# Patient Record
Sex: FEMALE
[De-identification: names, ages, dates, MRNs, and addresses within clinical notes are randomized per-mention and may not be internally consistent; named-entity substitution may affect disease eponyms.]

---

## 2019-08-19 ENCOUNTER — HOSPITAL ENCOUNTER (EMERGENCY)
Dept: HOSPITAL 90 - EDH | Age: 64
Discharge: HOME | End: 2019-08-19
Payer: COMMERCIAL

## 2019-08-19 DIAGNOSIS — Z88.1: ICD-10-CM
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
## 2019-08-19 DIAGNOSIS — S16.1XXA: Primary | ICD-10-CM
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
## 2019-08-19 DIAGNOSIS — M54.12: ICD-10-CM

## 2019-08-19 DIAGNOSIS — R20.2: ICD-10-CM

## 2019-08-19 DIAGNOSIS — Z90.710: ICD-10-CM

## 2019-08-19 LAB
ALBUMIN SERPL-MCNC: 4 G/DL (ref 3.5–5)
ALT SERPL-CCNC: 17 U/L (ref 12–78)
AMPHETAMINES UR QL SCN: NEGATIVE
APTT PPP: 26.6 SEC (ref 26.3–35.5)
AST SERPL-CCNC: 19 U/L (ref 10–37)
BARBITURATES UR QL SCN: NEGATIVE
BASOPHILS NFR BLD AUTO: 0.7 % (ref 0–5)
BENZODIAZ UR QL SCN: NEGATIVE
BILIRUB SERPL-MCNC: 0.6 MG/DL (ref 0.2–1)
BUN SERPL-MCNC: 14 MG/DL (ref 7–18)
BZE UR QL SCN: NEGATIVE
CANNABINOIDS UR QL SCN: NEGATIVE
CHLORIDE SERPL-SCNC: 105 MMOL/L (ref 101–111)
CK SERPL-CCNC: 83 U/L (ref 21–232)
CO2 SERPL-SCNC: 31 MMOL/L (ref 21–32)
CREAT SERPL-MCNC: 0.7 MG/DL (ref 0.5–1.5)
EOSINOPHIL NFR BLD AUTO: 1.1 % (ref 0–8)
ERYTHROCYTE [DISTWIDTH] IN BLOOD BY AUTOMATED COUNT: 13.2 % (ref 11–15.5)
GFR SERPL CREATININE-BSD FRML MDRD: 90 ML/MIN (ref 60–?)
GLUCOSE SERPL-MCNC: 97 MG/DL (ref 70–105)
HCT VFR BLD AUTO: 44.8 % (ref 36–48)
INR PPP: 1.03 (ref 0.85–1.15)
LDLC SERPL CALC-MCNC: 108 MG/DL (ref 0–99)
LYMPHOCYTES NFR SPEC AUTO: 13.7 % (ref 21–51)
MCH RBC QN AUTO: 29.8 PG (ref 27–33)
MCHC RBC AUTO-ENTMCNC: 33.6 G/DL (ref 32–36)
MCV RBC AUTO: 88.7 FL (ref 79–99)
MONOCYTES NFR BLD AUTO: 5.8 % (ref 3–13)
NEUTROPHILS NFR BLD AUTO: 78.7 % (ref 40–77)
NRBC BLD MANUAL-RTO: 0 % (ref 0–0.19)
OPIATES UR QL SCN: NEGATIVE
PCP UR QL SCN: NEGATIVE
PH UR STRIP: 7 [PH] (ref 5–8)
PLATELET # BLD AUTO: 269 K/UL (ref 130–400)
POTASSIUM SERPL-SCNC: 3.6 MMOL/L (ref 3.5–5.1)
PROT SERPL-MCNC: 7.5 G/DL (ref 6–8.3)
PROTHROMBIN TIME: 10.8 SEC (ref 9.6–11.6)
RBC # BLD AUTO: 5.05 MIL/UL (ref 4–5.5)
SODIUM SERPL-SCNC: 143 MMOL/L (ref 136–145)
SP GR UR STRIP: 1.01 (ref 1–1.03)
UROBILINOGEN UR STRIP-MCNC: 0.2 MG/DL (ref 0.2–1)
WBC # BLD AUTO: 8.5 K/UL (ref 4.8–10.8)

## 2019-08-19 PROCEDURE — 80305 DRUG TEST PRSMV DIR OPT OBS: CPT

## 2019-08-19 PROCEDURE — 80053 COMPREHEN METABOLIC PANEL: CPT

## 2019-08-19 PROCEDURE — 70450 CT HEAD/BRAIN W/O DYE: CPT

## 2019-08-19 PROCEDURE — 70496 CT ANGIOGRAPHY HEAD: CPT

## 2019-08-19 PROCEDURE — 93005 ELECTROCARDIOGRAM TRACING: CPT

## 2019-08-19 PROCEDURE — 36415 COLL VENOUS BLD VENIPUNCTURE: CPT

## 2019-08-19 PROCEDURE — 81003 URINALYSIS AUTO W/O SCOPE: CPT

## 2019-08-19 PROCEDURE — 82948 REAGENT STRIP/BLOOD GLUCOSE: CPT

## 2019-08-19 PROCEDURE — 83721 ASSAY OF BLOOD LIPOPROTEIN: CPT

## 2019-08-19 PROCEDURE — 85610 PROTHROMBIN TIME: CPT

## 2019-08-19 PROCEDURE — 84484 ASSAY OF TROPONIN QUANT: CPT

## 2019-08-19 PROCEDURE — 82550 ASSAY OF CK (CPK): CPT

## 2019-08-19 PROCEDURE — 71045 X-RAY EXAM CHEST 1 VIEW: CPT

## 2019-08-19 PROCEDURE — 85730 THROMBOPLASTIN TIME PARTIAL: CPT

## 2019-08-19 PROCEDURE — 70498 CT ANGIOGRAPHY NECK: CPT

## 2019-08-19 PROCEDURE — 85025 COMPLETE CBC W/AUTO DIFF WBC: CPT

## 2021-05-17 ENCOUNTER — HOSPITAL ENCOUNTER (EMERGENCY)
Dept: HOSPITAL 90 - EDH | Age: 66
Discharge: HOME | End: 2021-05-17
Payer: SELF-PAY

## 2021-05-17 DIAGNOSIS — Z88.6: ICD-10-CM

## 2021-05-17 DIAGNOSIS — Z88.8: ICD-10-CM

## 2021-05-17 DIAGNOSIS — R07.89: ICD-10-CM

## 2021-05-17 DIAGNOSIS — M54.16: Primary | ICD-10-CM

## 2021-05-17 DIAGNOSIS — G89.4: ICD-10-CM

## 2021-05-17 DIAGNOSIS — Z90.710: ICD-10-CM

## 2021-05-17 LAB
ALBUMIN SERPL-MCNC: 3.8 G/DL (ref 3.5–5)
ALT SERPL-CCNC: 27 U/L (ref 12–78)
AST SERPL-CCNC: 19 U/L (ref 10–37)
BASOPHILS NFR BLD AUTO: 1.2 % (ref 0–5)
BILIRUB SERPL-MCNC: 0.5 MG/DL (ref 0.2–1)
BNP SERPL-MCNC: 196 PG/ML (ref 0–100)
BUN SERPL-MCNC: 16 MG/DL (ref 7–18)
CHLORIDE SERPL-SCNC: 105 MMOL/L (ref 101–111)
CK SERPL-CCNC: 84 U/L (ref 21–232)
CO2 SERPL-SCNC: 33 MMOL/L (ref 21–32)
CREAT SERPL-MCNC: 0.7 MG/DL (ref 0.5–1.5)
EOSINOPHIL NFR BLD AUTO: 4.5 % (ref 0–8)
ERYTHROCYTE [DISTWIDTH] IN BLOOD BY AUTOMATED COUNT: 13.4 % (ref 11–15.5)
GFR SERPL CREATININE-BSD FRML MDRD: 90 ML/MIN (ref 60–?)
GLUCOSE SERPL-MCNC: 107 MG/DL (ref 70–105)
HCT VFR BLD AUTO: 44.4 % (ref 36–48)
LYMPHOCYTES NFR SPEC AUTO: 27.7 % (ref 21–51)
MCH RBC QN AUTO: 29.2 PG (ref 27–33)
MCHC RBC AUTO-ENTMCNC: 33.3 G/DL (ref 32–36)
MCV RBC AUTO: 87.6 FL (ref 79–99)
MONOCYTES NFR BLD AUTO: 8.7 % (ref 3–13)
NEUTROPHILS NFR BLD AUTO: 57.6 % (ref 40–77)
NRBC BLD MANUAL-RTO: 0 % (ref 0–0.19)
PLATELET # BLD AUTO: 222 K/UL (ref 130–400)
POTASSIUM SERPL-SCNC: 4 MMOL/L (ref 3.5–5.1)
PROT SERPL-MCNC: 7.1 G/DL (ref 6–8.3)
RBC # BLD AUTO: 5.07 MIL/UL (ref 4–5.5)
SODIUM SERPL-SCNC: 142 MMOL/L (ref 136–145)
WBC # BLD AUTO: 5.8 K/UL (ref 4.8–10.8)

## 2021-05-17 PROCEDURE — 84484 ASSAY OF TROPONIN QUANT: CPT

## 2021-05-17 PROCEDURE — 73630 X-RAY EXAM OF FOOT: CPT

## 2021-05-17 PROCEDURE — 93005 ELECTROCARDIOGRAM TRACING: CPT

## 2021-05-17 PROCEDURE — 72131 CT LUMBAR SPINE W/O DYE: CPT

## 2021-05-17 PROCEDURE — 80053 COMPREHEN METABOLIC PANEL: CPT

## 2021-05-17 PROCEDURE — 72170 X-RAY EXAM OF PELVIS: CPT

## 2021-05-17 PROCEDURE — 73610 X-RAY EXAM OF ANKLE: CPT

## 2021-05-17 PROCEDURE — 71046 X-RAY EXAM CHEST 2 VIEWS: CPT

## 2021-05-17 PROCEDURE — 83880 ASSAY OF NATRIURETIC PEPTIDE: CPT

## 2021-05-17 PROCEDURE — 73562 X-RAY EXAM OF KNEE 3: CPT

## 2021-05-17 PROCEDURE — 93971 EXTREMITY STUDY: CPT

## 2021-05-17 PROCEDURE — 85025 COMPLETE CBC W/AUTO DIFF WBC: CPT

## 2021-05-17 PROCEDURE — 82550 ASSAY OF CK (CPK): CPT

## 2021-05-17 PROCEDURE — 36415 COLL VENOUS BLD VENIPUNCTURE: CPT

## 2021-06-10 ENCOUNTER — HOSPITAL ENCOUNTER (EMERGENCY)
Dept: HOSPITAL 90 - EDH | Age: 66
Discharge: HOME | End: 2021-06-10
Payer: COMMERCIAL

## 2021-06-10 VITALS — DIASTOLIC BLOOD PRESSURE: 71 MMHG | SYSTOLIC BLOOD PRESSURE: 134 MMHG

## 2021-06-10 VITALS — HEIGHT: 65 IN | WEIGHT: 138.89 LBS | BODY MASS INDEX: 23.14 KG/M2

## 2021-06-10 VITALS — SYSTOLIC BLOOD PRESSURE: 132 MMHG | DIASTOLIC BLOOD PRESSURE: 74 MMHG

## 2021-06-10 DIAGNOSIS — R20.0: Primary | ICD-10-CM

## 2021-06-10 DIAGNOSIS — M79.604: ICD-10-CM

## 2021-06-10 DIAGNOSIS — R94.31: ICD-10-CM

## 2021-06-10 DIAGNOSIS — M79.605: ICD-10-CM

## 2021-06-10 DIAGNOSIS — Z79.899: ICD-10-CM

## 2021-06-10 DIAGNOSIS — M19.90: ICD-10-CM

## 2021-06-10 DIAGNOSIS — Z90.710: ICD-10-CM

## 2021-06-10 LAB
ALBUMIN SERPL-MCNC: 4.1 G/DL (ref 3.5–5)
ALT SERPL-CCNC: 28 U/L (ref 12–78)
AST SERPL-CCNC: 21 U/L (ref 10–37)
BASOPHILS NFR BLD AUTO: 0.7 % (ref 0–5)
BILIRUB SERPL-MCNC: 0.6 MG/DL (ref 0.2–1)
BNP SERPL-MCNC: 114 PG/ML (ref 0–100)
BUN SERPL-MCNC: 18 MG/DL (ref 7–18)
CHLORIDE SERPL-SCNC: 103 MMOL/L (ref 101–111)
CK SERPL-CCNC: 104 U/L (ref 21–232)
CO2 SERPL-SCNC: 31 MMOL/L (ref 21–32)
CREAT SERPL-MCNC: 0.6 MG/DL (ref 0.5–1.5)
EOSINOPHIL NFR BLD AUTO: 1.9 % (ref 0–8)
ERYTHROCYTE [DISTWIDTH] IN BLOOD BY AUTOMATED COUNT: 13.5 % (ref 11–15.5)
GFR SERPL CREATININE-BSD FRML MDRD: 107 ML/MIN (ref 60–?)
GLUCOSE SERPL-MCNC: 97 MG/DL (ref 70–105)
HCT VFR BLD AUTO: 45.3 % (ref 36–48)
LYMPHOCYTES NFR SPEC AUTO: 17.6 % (ref 21–51)
MCH RBC QN AUTO: 28.9 PG (ref 27–33)
MCHC RBC AUTO-ENTMCNC: 32.5 G/DL (ref 32–36)
MCV RBC AUTO: 89 FL (ref 79–99)
MONOCYTES NFR BLD AUTO: 8.5 % (ref 3–13)
MYOGLOBIN SERPL-MCNC: 27 NG/ML (ref 10–92)
NEUTROPHILS NFR BLD AUTO: 71 % (ref 40–77)
NRBC BLD MANUAL-RTO: 0 % (ref 0–0.19)
PLATELET # BLD AUTO: 265 K/UL (ref 130–400)
POTASSIUM SERPL-SCNC: 4.1 MMOL/L (ref 3.5–5.1)
PROT SERPL-MCNC: 7.6 G/DL (ref 6–8.3)
RBC # BLD AUTO: 5.09 MIL/UL (ref 4–5.5)
SODIUM SERPL-SCNC: 141 MMOL/L (ref 136–145)
TROPONIN I SERPL-MCNC: < 0.04 NG/ML (ref 0–0.06)
WBC # BLD AUTO: 8.9 K/UL (ref 4.8–10.8)

## 2021-06-10 PROCEDURE — 85025 COMPLETE CBC W/AUTO DIFF WBC: CPT

## 2021-06-10 PROCEDURE — 83880 ASSAY OF NATRIURETIC PEPTIDE: CPT

## 2021-06-10 PROCEDURE — 80053 COMPREHEN METABOLIC PANEL: CPT

## 2021-06-10 PROCEDURE — 83874 ASSAY OF MYOGLOBIN: CPT

## 2021-06-10 PROCEDURE — 93005 ELECTROCARDIOGRAM TRACING: CPT

## 2021-06-10 PROCEDURE — 82550 ASSAY OF CK (CPK): CPT

## 2021-06-10 PROCEDURE — 93970 EXTREMITY STUDY: CPT

## 2021-06-10 PROCEDURE — 36415 COLL VENOUS BLD VENIPUNCTURE: CPT

## 2021-06-10 PROCEDURE — 84484 ASSAY OF TROPONIN QUANT: CPT

## 2022-02-24 ENCOUNTER — HOSPITAL ENCOUNTER (OUTPATIENT)
Dept: HOSPITAL 90 - RAH | Age: 67
Discharge: HOME | End: 2022-02-24
Attending: INTERNAL MEDICINE
Payer: COMMERCIAL

## 2022-02-24 DIAGNOSIS — R22.1: ICD-10-CM

## 2022-02-24 DIAGNOSIS — E07.89: Primary | ICD-10-CM

## 2022-02-24 PROCEDURE — 76536 US EXAM OF HEAD AND NECK: CPT

## 2022-03-01 ENCOUNTER — HOSPITAL ENCOUNTER (EMERGENCY)
Dept: HOSPITAL 90 - EDH | Age: 67
Discharge: HOME | End: 2022-03-01
Payer: COMMERCIAL

## 2022-03-01 VITALS — DIASTOLIC BLOOD PRESSURE: 70 MMHG | SYSTOLIC BLOOD PRESSURE: 140 MMHG

## 2022-03-01 VITALS — WEIGHT: 136.03 LBS | BODY MASS INDEX: 21.86 KG/M2 | HEIGHT: 66 IN

## 2022-03-01 DIAGNOSIS — F41.9: ICD-10-CM

## 2022-03-01 DIAGNOSIS — Z88.6: ICD-10-CM

## 2022-03-01 DIAGNOSIS — G47.00: Primary | ICD-10-CM

## 2022-03-01 LAB
ALBUMIN SERPL-MCNC: 3.7 G/DL (ref 3.5–5)
ALT SERPL-CCNC: 28 U/L (ref 12–78)
AMPHETAMINES UR QL SCN: NEGATIVE
APPEARANCE UR: CLEAR
AST SERPL-CCNC: 22 U/L (ref 10–37)
BARBITURATES UR QL SCN: NEGATIVE
BASOPHILS NFR BLD AUTO: 0.5 % (ref 0–5)
BENZODIAZ UR QL SCN: NEGATIVE
BILIRUB SERPL-MCNC: 0.3 MG/DL (ref 0.2–1)
BILIRUB UR QL STRIP: NEGATIVE
BUN SERPL-MCNC: 26 MG/DL (ref 7–18)
BZE UR QL SCN: NEGATIVE
CANNABINOIDS UR QL SCN: NEGATIVE
CHLORIDE SERPL-SCNC: 101 MMOL/L (ref 101–111)
CO2 SERPL-SCNC: 29 MMOL/L (ref 21–32)
COLOR UR: YELLOW
CREAT SERPL-MCNC: 0.5 MG/DL (ref 0.5–1.5)
EOSINOPHIL NFR BLD AUTO: 1.7 % (ref 0–8)
ERYTHROCYTE [DISTWIDTH] IN BLOOD BY AUTOMATED COUNT: 13.4 % (ref 11–15.5)
GFR SERPL CREATININE-BSD FRML MDRD: 132 ML/MIN (ref 60–?)
GLUCOSE SERPL-MCNC: 97 MG/DL (ref 70–105)
GLUCOSE UR STRIP-MCNC: NEGATIVE MG/DL
HCT VFR BLD AUTO: 43.6 % (ref 36–48)
HGB UR QL STRIP: NEGATIVE
KETONES UR STRIP-MCNC: NEGATIVE MG/DL
LEUKOCYTE ESTERASE UR QL STRIP: (no result)
LYMPHOCYTES NFR SPEC AUTO: 14.3 % (ref 21–51)
MAGNESIUM SERPL-MCNC: 2 MG/DL (ref 1.8–2.4)
MCH RBC QN AUTO: 28.7 PG (ref 27–33)
MCHC RBC AUTO-ENTMCNC: 32.6 G/DL (ref 32–36)
MCV RBC AUTO: 88.3 FL (ref 79–99)
MONOCYTES NFR BLD AUTO: 7.3 % (ref 3–13)
NEUTROPHILS NFR BLD AUTO: 75.8 % (ref 40–77)
NITRITE UR QL STRIP: NEGATIVE
NRBC BLD MANUAL-RTO: 0 % (ref 0–0.19)
OPIATES UR QL SCN: NEGATIVE
PCP UR QL SCN: NEGATIVE
PH UR STRIP: 6.5 [PH] (ref 5–8)
PLATELET # BLD AUTO: 233 K/UL (ref 130–400)
POTASSIUM SERPL-SCNC: 4.1 MMOL/L (ref 3.5–5.1)
PROT SERPL-MCNC: 7.2 G/DL (ref 6–8.3)
PROT UR QL STRIP: NEGATIVE MG/DL
RBC # BLD AUTO: 4.94 MIL/UL (ref 4–5.5)
RBC #/AREA URNS HPF: (no result) /HPF (ref 0–1)
SODIUM SERPL-SCNC: 138 MMOL/L (ref 136–145)
SP GR UR STRIP: 1.01 (ref 1–1.03)
UROBILINOGEN UR STRIP-MCNC: 0.2 MG/DL (ref 0.2–1)
WBC # BLD AUTO: 8.3 K/UL (ref 4.8–10.8)
WBC #/AREA URNS HPF: (no result) /HPF (ref 0–1)

## 2022-03-01 PROCEDURE — 71045 X-RAY EXAM CHEST 1 VIEW: CPT

## 2022-03-01 PROCEDURE — 36415 COLL VENOUS BLD VENIPUNCTURE: CPT

## 2022-03-01 PROCEDURE — 80305 DRUG TEST PRSMV DIR OPT OBS: CPT

## 2022-03-01 PROCEDURE — 84443 ASSAY THYROID STIM HORMONE: CPT

## 2022-03-01 PROCEDURE — 85025 COMPLETE CBC W/AUTO DIFF WBC: CPT

## 2022-03-01 PROCEDURE — 84484 ASSAY OF TROPONIN QUANT: CPT

## 2022-03-01 PROCEDURE — 93005 ELECTROCARDIOGRAM TRACING: CPT

## 2022-03-01 PROCEDURE — 83735 ASSAY OF MAGNESIUM: CPT

## 2022-03-01 PROCEDURE — 81001 URINALYSIS AUTO W/SCOPE: CPT

## 2022-03-01 PROCEDURE — 84439 ASSAY OF FREE THYROXINE: CPT

## 2022-03-01 PROCEDURE — 80053 COMPREHEN METABOLIC PANEL: CPT

## 2022-05-04 ENCOUNTER — HOSPITAL ENCOUNTER (OUTPATIENT)
Dept: HOSPITAL 90 - LAB | Age: 67
End: 2022-05-04
Attending: INTERNAL MEDICINE
Payer: COMMERCIAL

## 2022-05-04 DIAGNOSIS — R07.9: Primary | ICD-10-CM

## 2022-05-04 PROCEDURE — 84484 ASSAY OF TROPONIN QUANT: CPT

## 2022-05-04 PROCEDURE — 36415 COLL VENOUS BLD VENIPUNCTURE: CPT

## 2022-05-04 PROCEDURE — 93005 ELECTROCARDIOGRAM TRACING: CPT

## 2022-05-04 PROCEDURE — 82550 ASSAY OF CK (CPK): CPT

## 2022-06-14 ENCOUNTER — HOSPITAL ENCOUNTER (OUTPATIENT)
Dept: HOSPITAL 90 - LAB | Age: 67
Discharge: HOME | End: 2022-06-14
Attending: OTOLARYNGOLOGY
Payer: COMMERCIAL

## 2022-06-14 DIAGNOSIS — H90.3: Primary | ICD-10-CM

## 2022-06-14 LAB
BUN SERPL-MCNC: 19 MG/DL (ref 7–18)
CREAT SERPL-MCNC: 0.6 MG/DL (ref 0.5–1.5)
GFR SERPL CREATININE-BSD FRML MDRD: 107 ML/MIN (ref 60–?)

## 2022-06-14 PROCEDURE — 82565 ASSAY OF CREATININE: CPT

## 2022-06-14 PROCEDURE — 84520 ASSAY OF UREA NITROGEN: CPT

## 2022-06-14 PROCEDURE — 36415 COLL VENOUS BLD VENIPUNCTURE: CPT

## 2022-08-05 ENCOUNTER — HOSPITAL ENCOUNTER (OUTPATIENT)
Dept: HOSPITAL 90 - RAH | Age: 67
Discharge: HOME | End: 2022-08-05
Attending: OTOLARYNGOLOGY
Payer: COMMERCIAL

## 2022-08-05 DIAGNOSIS — E04.1: ICD-10-CM

## 2022-08-05 DIAGNOSIS — E04.2: Primary | ICD-10-CM

## 2022-08-05 DIAGNOSIS — H90.3: ICD-10-CM

## 2022-08-05 PROCEDURE — 70551 MRI BRAIN STEM W/O DYE: CPT

## 2022-08-05 PROCEDURE — 76536 US EXAM OF HEAD AND NECK: CPT

## 2023-07-12 ENCOUNTER — HOSPITAL ENCOUNTER (OUTPATIENT)
Dept: HOSPITAL 90 - RAH | Age: 68
Discharge: HOME | End: 2023-07-12
Attending: INTERNAL MEDICINE
Payer: COMMERCIAL

## 2023-07-12 DIAGNOSIS — M54.2: ICD-10-CM

## 2023-07-12 DIAGNOSIS — M47.812: Primary | ICD-10-CM

## 2023-07-12 DIAGNOSIS — M48.02: ICD-10-CM

## 2023-07-12 PROCEDURE — 72040 X-RAY EXAM NECK SPINE 2-3 VW: CPT

## 2024-12-06 ENCOUNTER — HOSPITAL ENCOUNTER (EMERGENCY)
Dept: HOSPITAL 90 - EDH | Age: 69
Discharge: HOME | End: 2024-12-06
Payer: COMMERCIAL

## 2024-12-06 VITALS — BODY MASS INDEX: 22.66 KG/M2 | WEIGHT: 136.03 LBS | HEIGHT: 65 IN

## 2024-12-06 VITALS
DIASTOLIC BLOOD PRESSURE: 70 MMHG | RESPIRATION RATE: 16 BRPM | TEMPERATURE: 97.8 F | SYSTOLIC BLOOD PRESSURE: 110 MMHG | HEART RATE: 80 BPM | OXYGEN SATURATION: 99 %

## 2024-12-06 DIAGNOSIS — Z88.6: ICD-10-CM

## 2024-12-06 DIAGNOSIS — Z90.710: ICD-10-CM

## 2024-12-06 DIAGNOSIS — K02.9: Primary | ICD-10-CM

## 2024-12-06 PROCEDURE — 99283 EMERGENCY DEPT VISIT LOW MDM: CPT

## 2024-12-06 PROCEDURE — 96372 THER/PROPH/DIAG INJ SC/IM: CPT

## 2024-12-06 RX ADMIN — CLINDAMYCIN HYDROCHLORIDE ONE MG: 150 CAPSULE ORAL at 14:51

## 2024-12-06 NOTE — ERN
ED Note


History of Present Illness


Stated Complaint:  TOOTH PAIN


Chief Complaint:  Tooth Ache/Pain


Time Seen by MD:  14:29


Dictation:


PATIENT IS A 69-YEAR-OLD FEMALE COMING IN TODAY WITH COMPLAINTS OF A RIGHT UPPER

MOLAR TENDERNESS SWELLING FOR THE LAST FIVE DAYS.  NO FEVER NO CHILLS NO NAUSEA 

VOMITING.  STATES SHE HAD AN APPOINTMENT TODAY WITH HER DENTIST HOWEVER WHEN SHE

GOT TO HIS OFFICE HE WAS GONE FOR THE DAY.  SHE IS HERE FOR FURTHER MEDICATION

AND TREATMENT OF HER DENTAL PAIN


Allergies:  


Coded Allergies:  


     Tetracyclines (Unverified  Allergy, Unknown, 5/18/19)


     cetirizine (Unverified  Allergy, Unknown, 12/6/24)


     ibuprofen (Unverified  Allergy, Unknown, 5/18/21)


Home Meds


Active Scripts


Acetaminophen with Codeine (Acetaminophen-Cod #3 Tablet) 300 Mg-30 Mg Tablet, 1 

TAB PO Q4H PRN for MODERATE TO SEVERE PAIN, #12 TAB 0 Refills


   Prov:JADE IGLESIAS NP         12/6/24


Clindamycin HCl (Clindamycin HCl) 300 Mg Capsule, 1 CAP PO QID for 10 Days, #40 

CAP 0 Refills


   Prov:JADE IGLESIAS NP         12/6/24





Past Medical History


Past Medical History:  No Pertinent History


Surgical History:  Hysterectomy


Family History:  Negative


Social History:  Negative, Lives with family


Pregnancy History:  Not Applicable


RN Note Reviewed/Agreed w/PFSH:  Yes





Review of System


Dictation


CONSTITUTIONAL:  NEGATIVE EXCEPT FOR HPI


HEAD/FACE:  NEGATIVE EXCEPT FOR HPI


EENT:  NEGATIVE EXCEPT FOR HPI RIGHT UPPER MOLAR TENDERNESS DKA


RESPIRATORY: NEGATIVE EXCEPT FOR HPI


GASTROINTESTINAL/ABDOMINAL:   NEGATIVE EXCEPT FOR HPI


GENITOURINARY: NEGATIVE EXCEPT FOR HPI


MUSCULOSKELETAL: NEGATIVE EXCEPT FOR HPI


INTEGUMENTARY:  NEGATIVE EXCEPT FOR HPI


NEUROLOGICAL/PSYCH: NEGATIVE EXCEPT FOR HPI


HEMATOLOGIC/LYMPHATIC:  NEGATIVE EXCEPT FOR HPI





ALL SYSTEMS NEGATIVE, EXCEPT AS NOTED ABOVE.





13 POINT REVIEW OF SYSTEMS ASSESSED AND ALL NEGATIVE EXCEPT FOR ABOVE.





Initial Vital Sign


VS





                                   Vital Signs








  Date Time  Temp Pulse Resp B/P (MAP) Pulse Ox O2 Delivery O2 Flow Rate FiO2


 


12/6/24 14:29 98.2 78 18 182/81 98   


 


12/6/24 15:02      Room Air* 0 21











Physical Exam


Dictation


VITAL SIGNS REVIEWED 


GENERAL APPEARANCE: ALERT, ORIENTED X 3, NO ACUTE DISTRESS, WELL DEVELOPED, 

NOURISHED. 


HEAD AND FACE: NON-TRAUMATIC.


EYES: PERRL, PINK CONJUNCTIVAS, EYELID NO TRAUMA, ANTERIOR CHAMBER WITH ARCUS 

SENILIS. 


EARS: PINNAS INTACT AND NO SIGNS OF TRAUMA OR ERYTHEMA EAR CANALS CLEAR AND NO 

DISCHARGE TM NO ERYTHEMA 


NOSE: NO DISCHARGE, NO BLEEDING. 


OROPHARYNX: MOUTH NORMAL, TONGUE PINK, MILD DECAY SEEN AT 2  TOOTH MILD GINGIVAL

ERYTHEMA NOTED


PHARYNX CLEAR,NO ERYTHEMA, TONSILS NO EXUDATES, NO ABSCESSES NOTED,  MUCOUS 

MEMBRANE MOIST 


NECK: SUPPLE, NON-TENDER, NO THYROMEGALY, NO MASSES, NO JVD, NO BRUITS 


BREAST:DEFERRED 


CHEST:NO TENDERNESS, NO CREPITUS, NO PARADOXICAL MOVEMENT, NO RETRACTIONS 


LUNGS:CLEAR, WELL-VENTILATED, SYMMETRIC, NO RALES, NO WHEEZING, NO RHONCHI, NO 

STRIDOR, GOOD BREATH SOUNDS BILATERALLY 


HEART: REGULAR RATE, REGULAR RHYTHM, NO MURMUR, NO GALLOPS 


VASCULAR: NO PERIPHERAL EDEMA, 


ABDOMEN: SOFT, POSITIVE BOWEL SOUNDS, NONDISTENDED, NO GUARDING, 


NONTENDER, NO REBOUND, NO MASSES NO HEPATOMEGALY, NO SPLENOMEGALY, NO MURILLO'S 

SIGN, NO HERNIAS.


RECTAL: DEFERRED


GENITAL: DEFERRED


NEUROLOGICAL: NORMAL SPEECH,  MOTOR FUNCTION INTACT, SENSORY FUNCTION INTACT 


MUSCULOSKELETAL: NECK NONTENDER, FULL RANGE OF MOTION, BACK NONTENDER, FULL 

RANGE OF MOTION,


EXTREMITIES: NONTENDER, FULL RANGE OF MOTION 


SKIN: COLOR PINK, DRY, NO TURGOR, NO RASH, NO LACERATIONS, NO ABRASIONS, NO 

CONTUSIONS.


LYMPHATIC: DEFERRED





Results (Laboratory/Radiology)


Labs Reviewed?:  Yes





ED Course


ED Course





Orders








Procedure Category Date Status





  Time 


 


Clindamycin 150mg Cap PHA 12/6/24 Complete





 (Cleocin 150mg Cap  15:00 


 


Dexamethasone 4mg/Ml PHA 12/6/24 Complete





1ml Vial (Dexametha  15:00 








Current Medications








 Medications


  (Trade)  Dose


 Ordered  Sig/Polo


 Route


 PRN Reason  Start Time


 Stop Time Status Last Admin


Dose Admin


 


 Clindamycin HCl


  (Cleocin 150mg


 Cap)  600 mg  ONCE  ONCE


 PO


   12/6/24 15:00


 12/6/24 15:01 DC 12/6/24 14:51





 


 Dexamethasone


 Sodium Phosphate


  (dexaMETHasone


 4MG/ML 1ML VIAL)  8 mg  ONCE  ONCE


 IM


   12/6/24 15:00


 12/6/24 15:01 DC 12/6/24 14:52











Vital Signs








  Date Time  Temp Pulse Resp B/P (MAP) Pulse Ox O2 Delivery O2 Flow Rate FiO2


 


12/6/24 15:02 97.9 80 16 110/70 99 Room Air* 0 21


 


12/6/24 14:29 98.2 78 18 182/81 98   





FOURTEEN 50, NO LABS OR IMAGING INDICATED.  WE WILL TREAT PATIENT WITH 

CLINDAMYCIN AND DECADRON.  SHE REFUSED P.R.N. ANALGESIA STATES I HAVE TYLENOL 

AT HOME AND IT WORKS.





Medical Decision Making


MDM


MEDICAL DISCHARGE MAKING BASED ON EMPIRIC TREATMENT FOR DENTAL CARIES AND 

DENTALGIA.


PATIENT GIVEN DECADRON8 MG FOR INFLAMMATION, LOADED WITH 600 MG CLINDAMYCIN


SHE REFUSED P.R.N. ANALGESIA STATES SHE HAS TYLENOL AT HOME.


I ADVISED HER TO FOLLOW UP WITH HER DENTIST OR SEE ANOTHER DENTIST IN THE [place]

[place] THAT HAS NOT AFTER HOURS NUMBER.





DX & DISP


Disposition:  Discharge


Departure


Impression:  


   Primary Impression:  Dental caries extending into pulp


   Additional Impression:  Dentalgia


Condition:  Stable


Scripts


Acetaminophen with Codeine (Acetaminophen-Cod #3 Tablet) 300 Mg-30 Mg Tablet


1 TAB PO Q4H PRN for MODERATE TO SEVERE PAIN, #12 TAB 0 Refills


   Prov: JADE IGLESIAS NP         12/6/24 


Clindamycin HCl (Clindamycin HCl) 300 Mg Capsule


1 CAP PO QID for 10 Days, #40 CAP 0 Refills


   Prov: JADE IGLESIAS NP         12/6/24





Additional Instructions:  


Follow-up with primary care provider in 1 to 2 days.  Take medications as 

directed here in the emergency room.  Okay to continue home medications unless 

otherwise discussed during your visit in the emergency room today.  Return to 

your nearest emergency room if symptoms worsen or if there is no improvement.  

Call 911 if you need immediate assistance.  Take Tylenol or Motrin 

over-the-counter as needed and if no contraindications are present.  Increase 

oral hydration.  A wound culture or urine culture was ordered here in the 

emergency room department please follow-up with primary care provider and advise

them to get repeat ports from our facility.  If you had any Ace wrap/splints 

that were applied here, please do not remove them until you see your primary 

care or specialty.





Take clindamycin as directed until gone.  Suggest dental soft diet.  See your 

dentist or follow up in the yellow pages for a dentist in [place] with a an 

after hours emergency number


Referrals:  


SMITA CHANDLER MD (PCP)


Time of Disposition:  14:49


I have reviewed the case, and I agree with, Diagnosis and Plan





ATTESTATION BY PHYSICIAN


I PERFORMED THE SUBSTANTIVE PORTION OF THE VISIT.  I HAVE REVIEWED AND 

PERSONALLY MADE AND APPROVED THE MANAGEMENT PLAN THAT IS DOCUMENTED IN THE NOTE 

BY MYSELF FOR THE A PP.  I ACKNOWLEDGED FOR RESPONSIBILITY FOR THE PATIENT'S 

MANAGEMENT PLAN.











JADE IGLESIAS NP               Dec 6, 2024 14:51


DANG DE LA CRUZ MD                 Dec 8, 2024 07:44

## 2025-03-03 ENCOUNTER — HOSPITAL ENCOUNTER (OUTPATIENT)
Dept: HOSPITAL 90 - RAH | Age: 70
Discharge: HOME | End: 2025-03-03
Attending: INTERNAL MEDICINE
Payer: COMMERCIAL

## 2025-03-03 DIAGNOSIS — K59.09: ICD-10-CM

## 2025-03-03 DIAGNOSIS — E04.1: Primary | ICD-10-CM

## 2025-03-03 DIAGNOSIS — J32.9: ICD-10-CM

## 2025-03-03 DIAGNOSIS — Z88.6: ICD-10-CM

## 2025-03-03 DIAGNOSIS — Z87.891: ICD-10-CM

## 2025-03-03 DIAGNOSIS — I42.9: ICD-10-CM

## 2025-03-03 DIAGNOSIS — Z79.899: ICD-10-CM

## 2025-03-03 DIAGNOSIS — Z90.710: ICD-10-CM

## 2025-03-03 DIAGNOSIS — Z98.890: ICD-10-CM

## 2025-03-03 DIAGNOSIS — E78.5: ICD-10-CM

## 2025-03-03 DIAGNOSIS — F41.9: ICD-10-CM

## 2025-03-03 DIAGNOSIS — M19.90: ICD-10-CM

## 2025-03-03 DIAGNOSIS — Z79.01: ICD-10-CM

## 2025-03-03 DIAGNOSIS — K21.9: ICD-10-CM

## 2025-03-03 LAB
APTT PPP: 27.5 SEC (ref 26.3–35.5)
INR PPP: 1.09 (ref 0.85–1.15)
PROTHROMBIN TIME: 11.5 SEC (ref 9.6–11.6)

## 2025-03-03 PROCEDURE — 85610 PROTHROMBIN TIME: CPT

## 2025-03-03 PROCEDURE — 88177 CYTP FNA EVAL EA ADDL: CPT

## 2025-03-03 PROCEDURE — 88305 TISSUE EXAM BY PATHOLOGIST: CPT

## 2025-03-03 PROCEDURE — 88173 CYTOPATH EVAL FNA REPORT: CPT

## 2025-03-03 PROCEDURE — 76942 ECHO GUIDE FOR BIOPSY: CPT

## 2025-03-03 PROCEDURE — 88172 CYTP DX EVAL FNA 1ST EA SITE: CPT

## 2025-03-03 PROCEDURE — 36415 COLL VENOUS BLD VENIPUNCTURE: CPT

## 2025-03-03 PROCEDURE — 10005 FNA BX W/US GDN 1ST LES: CPT

## 2025-03-03 PROCEDURE — 85730 THROMBOPLASTIN TIME PARTIAL: CPT

## 2025-03-03 NOTE — NUR
U/S GD RT THYROID FNA

PROCEDURE PERFORMED BY DR JAYME BERG.  PUNCTURE  SITE RT NECK AND  PATIENT  TOLERATED PROCEDURE 
WELL.  SPECIMEN X 4 COLLECTED AND SENT TO LAB.  END  OF  PROCEDURE  AT 1150.  ASPIRATION 
NEEDLE REMOVED AND DRESSING APPLIED.  NO BLEEDING NOTED.  DISCHARGE INSTRUCTIONS GIVEN TO 
PATIENT AND VERBALIZED UNDERSTANDING.  DISCHARGED  VIA  AMBULATION AT 1230.  AAO X3  WITH  
NO  C/O  PAIN.

## 2025-03-03 NOTE — HMCIMG
US FINE NDL ASP IR



HISTORY: Right thyroid nodule



COMPARISON: None



TECHNIQUE: Informed consent was obtained. Risks and benefits were

explained to the patient. A timeout was performed. Patient was prepped

and draped in a sterile fashion. Local anesthetics was given as

required. Under sonographic guidance, right thyroid nodule was

localized. Ultrasound guidance needle aspiration was performed. 4 passes

were made. 



FINDINGS: Patient tolerated procedure without complication. Patient left

the department in good condition.



IMPRESSION:

1. Uncomplicated ultrasound guidance right thyroid nodule needle

aspiration.

## 2025-07-14 ENCOUNTER — HOSPITAL ENCOUNTER (OUTPATIENT)
Dept: HOSPITAL 90 - RAH | Age: 70
Discharge: HOME | End: 2025-07-14
Attending: INTERNAL MEDICINE
Payer: COMMERCIAL

## 2025-07-14 DIAGNOSIS — M50.121: ICD-10-CM

## 2025-07-14 DIAGNOSIS — M50.11: Primary | ICD-10-CM

## 2025-07-14 DIAGNOSIS — M43.8X2: ICD-10-CM

## 2025-07-14 DIAGNOSIS — M43.12: ICD-10-CM

## 2025-07-14 DIAGNOSIS — M50.123: ICD-10-CM

## 2025-07-14 DIAGNOSIS — M50.021: ICD-10-CM

## 2025-07-14 DIAGNOSIS — M48.02: ICD-10-CM

## 2025-07-14 DIAGNOSIS — G95.89: ICD-10-CM

## 2025-07-14 DIAGNOSIS — M50.122: ICD-10-CM

## 2025-07-14 DIAGNOSIS — M47.12: ICD-10-CM

## 2025-07-14 DIAGNOSIS — E04.2: ICD-10-CM

## 2025-07-14 DIAGNOSIS — M25.78: ICD-10-CM

## 2025-07-14 DIAGNOSIS — M47.22: ICD-10-CM

## 2025-07-14 PROCEDURE — 72141 MRI NECK SPINE W/O DYE: CPT

## 2025-07-22 ENCOUNTER — HOSPITAL ENCOUNTER (OUTPATIENT)
Dept: HOSPITAL 90 - RAH | Age: 70
Discharge: HOME | End: 2025-07-22
Attending: INTERNAL MEDICINE
Payer: COMMERCIAL

## 2025-07-22 DIAGNOSIS — R07.89: Primary | ICD-10-CM

## 2025-07-22 PROCEDURE — 71120 X-RAY EXAM BREASTBONE 2/>VWS: CPT
